# Patient Record
Sex: FEMALE
[De-identification: names, ages, dates, MRNs, and addresses within clinical notes are randomized per-mention and may not be internally consistent; named-entity substitution may affect disease eponyms.]

---

## 2017-01-05 PROBLEM — Z00.00 ENCOUNTER FOR PREVENTIVE HEALTH EXAMINATION: Status: ACTIVE | Noted: 2017-01-05

## 2020-10-30 ENCOUNTER — TRANSCRIPTION ENCOUNTER (OUTPATIENT)
Age: 60
End: 2020-10-30

## 2024-03-12 ENCOUNTER — APPOINTMENT (OUTPATIENT)
Dept: PHYSICAL MEDICINE AND REHAB | Facility: CLINIC | Age: 64
End: 2024-03-12
Payer: COMMERCIAL

## 2024-03-12 DIAGNOSIS — Z78.9 OTHER SPECIFIED HEALTH STATUS: ICD-10-CM

## 2024-03-12 DIAGNOSIS — Z80.9 FAMILY HISTORY OF MALIGNANT NEOPLASM, UNSPECIFIED: ICD-10-CM

## 2024-03-12 DIAGNOSIS — Z86.79 PERSONAL HISTORY OF OTHER DISEASES OF THE CIRCULATORY SYSTEM: ICD-10-CM

## 2024-03-12 PROBLEM — Z00.00 ENCOUNTER FOR PREVENTIVE HEALTH EXAMINATION: Status: ACTIVE | Noted: 2024-03-12

## 2024-03-12 PROCEDURE — 20553 NJX 1/MLT TRIGGER POINTS 3/>: CPT

## 2024-03-12 PROCEDURE — 99203 OFFICE O/P NEW LOW 30 MIN: CPT | Mod: 25

## 2024-03-12 NOTE — PHYSICAL EXAM
[FreeTextEntry1] : KASSIDY is a 63 year   old female   Constitutional: healthy appearing, NAD, and normal body habitus  NECK ROM: flexion to 30 with pain, ext to 30, rotation to 70 deg bilat  Inspection: no erythema, warmth Spine: no TTP in spinous process Soft tissue palpation:  TTP in cervical paraspinals, trapezius  5/5 bilateral elb flex/ext, WE, finger abd/flex sensation intact in bilat UE  Special tests: neg Spurling, Padilla

## 2024-03-12 NOTE — PROCEDURE
[de-identified] : Indication: myofascial pain   After informed consent, she elected to proceed with a trigger point injection into the left cervical paraspinals, and bilateral trapezius. I confirmed no prior adverse reactions, no active infections, and no relevant allergies.   The skin was prepped in the usual sterile manner. The muscles were pinched and elevated from the chest wall to avoid puncturing the lung. The sites were injected with local anesthetic followed by local needling. The injection was completed without complication and a bandage was applied. She tolerated the procedure well and was given post-injection instructions.   Cold Tx x 48 hours, analgesics prn. Medications: 0.5 ml of 1% Lidocaine per site       Exp 7/2025 Manufacture: [employer] [id number] LOT [id number]

## 2024-03-12 NOTE — ASSESSMENT
[FreeTextEntry1] : MRI [facility] shows multilevel foraminal stenosis, joint oa, C4-5 and C5-6 disc/ridge complex.  Discussed diagnosis and treatment plan including PT. Get back to HEP ice area often try massage hook do not recommend disc replacement yet since there are at least 2 discs involved seeing Dr Pond soon she had immediate relief  f/u 2 wk, consider gabapentin

## 2024-03-12 NOTE — HISTORY OF PRESENT ILLNESS
[FreeTextEntry1] : Location: neck Severity: 3/10 Duration: over 1 yr Context: had a lot of chiropractic tx from her friend Aggravating Factors: turning, moving neck Alleviating Factors: first ASHLEY, less for 2nd ASHLEY with Dr Rafal Marie at [facility] Associated Symptoms: denies weight loss, fever, chills, change in bowel/bladder habits, weakness, numbness, +tingling, radiation down upper arms; denies dropping things Prior Studies: MRI 2024

## 2024-03-26 ENCOUNTER — APPOINTMENT (OUTPATIENT)
Dept: PHYSICAL MEDICINE AND REHAB | Facility: CLINIC | Age: 64
End: 2024-03-26
Payer: COMMERCIAL

## 2024-03-26 DIAGNOSIS — M75.41 IMPINGEMENT SYNDROME OF RIGHT SHOULDER: ICD-10-CM

## 2024-03-26 DIAGNOSIS — M79.18 MYALGIA, OTHER SITE: ICD-10-CM

## 2024-03-26 PROCEDURE — 73030 X-RAY EXAM OF SHOULDER: CPT | Mod: RT

## 2024-03-26 PROCEDURE — 20552 NJX 1/MLT TRIGGER POINT 1/2: CPT

## 2024-03-26 PROCEDURE — 99213 OFFICE O/P EST LOW 20 MIN: CPT | Mod: 25

## 2024-03-26 NOTE — DATA REVIEWED
[FreeTextEntry1] : office xrays of right shoulder ap and outlet show type 2 acromion, mild AC joint inferiorly.

## 2024-03-26 NOTE — PROCEDURE
[de-identified] : Indication: myofascial pain   After informed consent, she elected to proceed with a trigger point injection into the bilateral trapezius. I confirmed no prior adverse reactions, no active infections, and no relevant allergies.   The skin was prepped in the usual sterile manner. The muscles were pinched and elevated from the chest wall to avoid puncturing the lung. The sites were injected with local anesthetic followed by local needling. The injection was completed without complication and a bandage was applied. She tolerated the procedure well and was given post-injection instructions.   Cold Tx x 48 hours, analgesics prn. Medications: 0.5 ml of 1% Lidocaine per site       Exp 7/2025 Manufacture: [employer] [id number] LOT [id number]

## 2024-03-26 NOTE — ASSESSMENT
[FreeTextEntry1] : MRI [facility] shows multilevel foraminal stenosis, joint oa, C4-5 and C5-6 disc/ridge complex.  Discussed diagnosis and treatment plan including PT. Get back to HEP ice area often reminded to try massage hook taught rows do not recommend disc replacement yet since there are at least 2 discs involved Dr Pond only does lumbar so she has to see Dr Alaina Wallace  f/u 2 wk, consider gabapentin

## 2024-03-26 NOTE — PHYSICAL EXAM
[FreeTextEntry1] : KASSIDY is a 63 year   old female   Constitutional: healthy appearing, NAD, and normal body habitus  NECK ROM: flexion to 30 with pain, ext to 30, rotation to 70 deg bilat  Inspection: no erythema, warmth Spine: no TTP in spinous process Soft tissue palpation:  TTP in cervical paraspinals, trapezius  5/5 bilateral elb flex/ext, WE, finger abd/flex sensation intact in bilat UE  Special tests: right shoulder abd to 100 deg, ER to 90, IR to 40 +Hawkin no TTP in ac joint

## 2024-04-02 ENCOUNTER — APPOINTMENT (OUTPATIENT)
Dept: ORTHOPEDIC SURGERY | Facility: CLINIC | Age: 64
End: 2024-04-02
Payer: COMMERCIAL

## 2024-04-02 DIAGNOSIS — M48.02 SPINAL STENOSIS, CERVICAL REGION: ICD-10-CM

## 2024-04-02 DIAGNOSIS — M50.20 OTHER CERVICAL DISC DISPLACEMENT, UNSPECIFIED CERVICAL REGION: ICD-10-CM

## 2024-04-02 PROCEDURE — 99204 OFFICE O/P NEW MOD 45 MIN: CPT

## 2024-04-03 PROBLEM — M50.20 DISPLACEMENT OF CERVICAL INTERVERTEBRAL DISC WITHOUT MYELOPATHY: Status: ACTIVE | Noted: 2024-03-12

## 2024-04-03 PROBLEM — M48.02 FORAMINAL STENOSIS OF CERVICAL REGION: Status: ACTIVE | Noted: 2024-03-12

## 2024-04-03 NOTE — ASSESSMENT
[FreeTextEntry1] : 63 y/o female presenting for evaluation of chronic neck pain which has been worsening over the past year. Pt's symptoms are worse on the left side and radiates into her left upper arm. She gets intermittent tingling in her left arm. She also recently has been experiencing symptoms in right shoulder. Pt was previously followed by Dr. Leon with [facility] and recently had MRI performed. Dr Leon recommended fusion surgery. She went to [facility] for second opinion and was told no surgery indicated at this time. She had lidocaine injections 1 week ago with Dr. Fermin and is awaiting second set in 1 week. She noticed minimal improved. She has had 2epidurals in the past, the first helped but the second didn't. She takes ibuprofen daily and tylenol intermittently. She hasn't tried PT recently. She denies recent illness, fevers, numbness, weakness, balance problems, saddle anesthesia, urinary retention or fecal incontinence.  I independently reviewed [facility] MRI cervical which shows multilevel severe foraminal stenosis at C4-C7. Degenerative changes. No spinal cord compression. No fractures or dislocations. Recommended facet joint injections and referral given for consideration. Physiatry referral given for EMG. Begin PT. Continue ibuprofen and tylenol. Follow up in 3-4 weeks. We discussed red flag symptoms that would require emergent evaluation. She knows to call with any questions or concerns or if her symptoms acutely worsen.

## 2024-04-03 NOTE — PHYSICAL EXAM
[de-identified] : General: No acute distress, conversant, well-nourished. Head: Normocephalic, atraumatic Neck: trachea midline, FROM Heart: normotensive and normal rate and rhythm Lungs: No labored breathing Skin: No abrasions, no rashes, no edema Psych: Alert and oriented to person, place and time Extremities: no peripheral edema or digital cyanosis Gait: Normal gait. Can perform tandem gait.   Vascular: warm and well perfused distally, palpable distal pulses MSK: Spine:  No tenderness to palpation.  No step-off, no deformity.  NEURO: Sensation           Left            C5     2/2                C6     2/2                C7     2/2                C8     2/2               T1     2/2                        Right          C5     2/2                C6     2/2                C7     2/2                C8     2/2               T1     2/2        Motor:                                                 Left              C5 (deltoid abduction)             5/5                C6 (biceps flexion)                   5/5                 C7 (triceps extension)             5/5                C8 (finger flexion)                     5/5                T1 (interosseous)                     5/5                                                            Right            C5 (deltoid abduction)             5/5                C6 (biceps flexion)                   5/5                 C7 (triceps extension)             5/5                C8 (finger flexion)                     5/5                T1 (interosseous)                     5/5                       Sensation  Left L2  -  2/2             Left L3  -  2/2 Left L4  -  2/2 Left L5  -  2/2 Left S1  -  2/2  Right L2  -  2/2             Right L3  -  2/2 Right L4  -  2/2 Right L5  -  2/2 Right S1  -  2/2  Motor:  Left L2 (hip flexion)                            5/5                 Left L3 (knee extension)                   5/5                 Left L4 (ankle dorsiflexion)                 5/5                 Left L5 (long toe extensor)                5/5                 Left S1 (ankle plantar flexion)           5/5  Right L2 (hip flexion)                            5/5                 Right L3 (knee extension)                   5/5                 Right L4 (ankle dorsiflexion)                 5/5                 Right L5 (long toe extensor)                5/5                 Right S1 (ankle plantar flexion)           5/5  Reflexes: Normal and symmetric Negative Spurlings test.  Negative Hoffmans reflex.   Negative clonus.  Down-going Babinski. [de-identified] : I independently reviewed [facility] MRI cervical which shows multilevel severe foraminal stenosis at C4-C7. Degenerative changes. No spinal cord compression. No fractures or dislocations.

## 2024-04-03 NOTE — HISTORY OF PRESENT ILLNESS
[de-identified] : 65 y/o female presenting for evaluation of chronic neck pain which has been worsening over the past year. Pt's symptoms are worse on the left side and radiates into her left upper arm. She gets intermittent tingling in her left arm. She also recently has been experiencing symptoms in right shoulder. Pt was previously followed by Dr. Leon with [facility] and recently had MRI performed. Dr Leon recommended fusion surgery. She went to [facility] for second opinion and was told no surgery indicated at this time. She had lidocaine injections 1 week ago with Dr. Fermin and is awaiting second set in 1 week. She noticed minimal improved. She has had 2 epidurals in the past, the first helped but the second didn't. She takes ibuprofen daily and tylenol intermittently. She hasn't tried PT recently but was given referral. denies recent illness, fevers, numbness, weakness, balance problems, saddle anesthesia, urinary retention or fecal incontinence.  neck pain no trauma/injuries  duration: 1 year  numbness/tingling in left arm  pain radiates to bl shoulders  Advil/Tylenol/Aleve with relief  Epidurals with relief  PT minimal relief  MRI report [facility]

## 2024-04-08 ENCOUNTER — APPOINTMENT (OUTPATIENT)
Dept: PHYSICAL MEDICINE AND REHAB | Facility: CLINIC | Age: 64
End: 2024-04-08

## 2024-04-09 ENCOUNTER — APPOINTMENT (OUTPATIENT)
Dept: PAIN MANAGEMENT | Facility: CLINIC | Age: 64
End: 2024-04-09

## 2024-04-09 ENCOUNTER — APPOINTMENT (OUTPATIENT)
Dept: PHYSICAL MEDICINE AND REHAB | Facility: CLINIC | Age: 64
End: 2024-04-09

## 2024-04-16 ENCOUNTER — TRANSCRIPTION ENCOUNTER (OUTPATIENT)
Age: 64
End: 2024-04-16

## 2024-04-30 ENCOUNTER — APPOINTMENT (OUTPATIENT)
Dept: ORTHOPEDIC SURGERY | Facility: CLINIC | Age: 64
End: 2024-04-30

## 2025-04-05 ENCOUNTER — TRANSCRIPTION ENCOUNTER (OUTPATIENT)
Age: 65
End: 2025-04-05